# Patient Record
Sex: FEMALE | URBAN - METROPOLITAN AREA
[De-identification: names, ages, dates, MRNs, and addresses within clinical notes are randomized per-mention and may not be internally consistent; named-entity substitution may affect disease eponyms.]

---

## 2017-01-13 ENCOUNTER — THERAPY VISIT (OUTPATIENT)
Dept: PHYSICAL THERAPY | Facility: CLINIC | Age: 82
End: 2017-01-13
Payer: MEDICARE

## 2017-01-13 DIAGNOSIS — M17.0 PRIMARY OSTEOARTHRITIS OF BOTH KNEES: Primary | ICD-10-CM

## 2017-01-13 DIAGNOSIS — R26.2 DIFFICULTY WALKING: ICD-10-CM

## 2017-01-13 PROCEDURE — G8978 MOBILITY CURRENT STATUS: HCPCS | Mod: GP | Performed by: PHYSICAL THERAPIST

## 2017-01-13 PROCEDURE — 97112 NEUROMUSCULAR REEDUCATION: CPT | Mod: GP | Performed by: PHYSICAL THERAPIST

## 2017-01-13 PROCEDURE — 97110 THERAPEUTIC EXERCISES: CPT | Mod: GP | Performed by: PHYSICAL THERAPIST

## 2017-01-13 PROCEDURE — G8979 MOBILITY GOAL STATUS: HCPCS | Mod: GP | Performed by: PHYSICAL THERAPIST

## 2017-01-13 ASSESSMENT — ACTIVITIES OF DAILY LIVING (ADL)
KNEE_ACTIVITY_OF_DAILY_LIVING_SUM: 45
GO UP STAIRS: ACTIVITY IS SOMEWHAT DIFFICULT
SWELLING: I HAVE THE SYMPTOM BUT IT DOES NOT AFFECT MY ACTIVITY
STAND: ACTIVITY IS SOMEWHAT DIFFICULT
RISE FROM A CHAIR: ACTIVITY IS MINIMALLY DIFFICULT
SIT WITH YOUR KNEE BENT: ACTIVITY IS MINIMALLY DIFFICULT
HOW_WOULD_YOU_RATE_THE_OVERALL_FUNCTION_OF_YOUR_KNEE_DURING_YOUR_USUAL_DAILY_ACTIVITIES?: NEARLY NORMAL
GO DOWN STAIRS: ACTIVITY IS SOMEWHAT DIFFICULT
KNEEL ON THE FRONT OF YOUR KNEE: I AM UNABLE TO DO THE ACTIVITY
WEAKNESS: THE SYMPTOM AFFECTS MY ACTIVITY SLIGHTLY
STIFFNESS: I HAVE THE SYMPTOM BUT IT DOES NOT AFFECT MY ACTIVITY
RAW_SCORE: 45
GIVING WAY, BUCKLING OR SHIFTING OF KNEE: I HAVE THE SYMPTOM BUT IT DOES NOT AFFECT MY ACTIVITY
SQUAT: ACTIVITY IS FAIRLY DIFFICULT
LIMPING: I HAVE THE SYMPTOM BUT IT DOES NOT AFFECT MY ACTIVITY
PAIN: THE SYMPTOM AFFECTS MY ACTIVITY SLIGHTLY
KNEE_ACTIVITY_OF_DAILY_LIVING_SCORE: 64.29
WALK: ACTIVITY IS MINIMALLY DIFFICULT
AS_A_RESULT_OF_YOUR_KNEE_INJURY,_HOW_WOULD_YOU_RATE_YOUR_CURRENT_LEVEL_OF_DAILY_ACTIVITY?: NEARLY NORMAL
HOW_WOULD_YOU_RATE_THE_CURRENT_FUNCTION_OF_YOUR_KNEE_DURING_YOUR_USUAL_DAILY_ACTIVITIES_ON_A_SCALE_FROM_0_TO_100_WITH_100_BEING_YOUR_LEVEL_OF_KNEE_FUNCTION_PRIOR_TO_YOUR_INJURY_AND_0_BEING_THE_INABILITY_TO_PERFORM_ANY_OF_YOUR_USUAL_DAILY_ACTIVITIES?: 60

## 2017-01-13 NOTE — LETTER
DEPARTMENT OF HEALTH AND HUMAN SERVICES  CENTERS FOR MEDICARE & MEDICAID SERVICES    PLAN/UPDATED PLAN OF PROGRESS FOR OUTPATIENT REHABILITATION    PATIENTS NAME:  Jazzmine Obrien   : 1930  PROVIDER NUMBER:    [id number]    [id number]      PROVIDER NAME: [place] [facility]    MEDICAL RECORD NUMBER: [id number]     START OF CARE DATE:  SOC Date: 2016  TYPE:  PT    PRIMARY/TREATMENT DIAGNOSIS: (Pertinent Medical Diagnosis)   Primary osteoarthritis of both knees  Difficulty walking    VISITS FROM START OF CARE:  Rxs Used: 11    MEDICARE CERTIFICATION:  Patient insurance became Medicare on 2017. The current information from SOC on 17. This note includes information from the initial evaluation on 16 as well as SOC date.      INITIAL EVALUATION SUBJECTIVE:  Jazzmine Obrien is a 86 year old female with a bilateral knees (with difficulty walking) condition.  Condition occurred with:  Degenerative joint disease (history of 3 falls in the past 5 months).    This is a chronic condition  Patient saw MD on 16 for progressive, ongoing B knee pain and difficulty walking.    Patient reports pain:  In the joint and anterior.  Radiates to:  Lower leg.  Pain is described as stabbing and aching and is intermittent and reported as 5/10.  Associated symptoms:  Buckling/giving out, loss of strength and loss of motion/stiffness. Pain is worse in the A.M..  Symptoms are exacerbated by bending/squatting, ascending stairs, descending stairs, standing, walking and kneeling and relieved by rest and NSAID's.  Since onset symptoms are gradually worsening.  Special tests:  X-ray (DJD, not bone on bone yet).  Previous treatment includes other (knee injections, cortisone and/or gel).  There was mild improvement following previous treatment.  General health as reported by patient is good.  Pertinent medical history includes:  Diabetes, overweight, thyroid problems and  smoking.  Medical allergies: no.  Other surgeries include:  None reported.  Current medications:  Pain medication and thyroid medication.  Current occupation is Retired.    Barriers include:  Stairs and bathroom/bedroom on second floor.  Red flags:  Significant weakness.      PATIENTS NAME:  Jazzmine Obrien   : 1930    CURRENT SUBJECTIVE  HPI  Knee Activity of Daily Living Score: 64.29   Subjective: Since starting PT last August, patient feeling approximately 60% improved. For the past 4-6 weeks patient has been experiencing increased left knee pain, feeling that her most recent(11/10/16) cortisone injection helped her very much. Is able to ambulate around the house without her cane, but does use her cane when she leaves the house. Is able to walk short distances throughout a grocery store, limiting how much she walks, letting her  go and get items at the back of the store. Able to stand for 10-15 minutes with 5/10 PL(left knee).   Current Pain level: 5/10.     Initial Pain level: 5/10.   Changes in function:  None in the past 3 weeks(see goal flow sheet for current/updated functional goals)  Adverse reaction to treatment or activity: None    INITIAL OBJECTIVE FINDINGS:  Standing Alignment:    Knee:  Genu varus L and genu valgus R    Gait:    Gait Type:  Antalgic   Assistive Devices:  Cane  Deviations:  Lumbar:  Trunk flexionGeneral Deviations:  Base of support incr, peña decr, stance time decr and stride length decr    Flexibility/Screens:   Lower Extremity:  Decreased left lower extremity flexibility:Hip Flexors; Hamstrings and Gastroc  Decreased right lower extremity flexibility:  Hip Flexors; Hamstrings and Gastroc    Hip Evaluation  Hip Strength:    Flexion:   Left: 4-/5   -  Pain:  Right: 4-/5   -  Pain:                 Extension:  Left: 3-/5  -  Pain:Right: 3-/5    -  Pain:    Abduction:  Left: 3-/5    +/-   Pain:Right: 3-/5   +/-   Pain:    Knee Evaluation:  ROM:    AROM  Hyperextension:  Left:   0    Right: 0  Extension:  Left: 10    Right:  10  Flexion: Left: 112    Right: 112  Strength:   Extension:  Left: 3/5    Pain:+      Right: 3/5    Pain:+  Flexion:  Left: 3/5    Pain:+/-      Right: 3/5    Pain:+/-    Quad Set Left:  Fair    Pain: -   Quad Set Right:  Fair    Pain: +/-  Functional Testing:    PATIENTS NAME:  Jazzmine Obrien   : 1930    Proprioception:   Stork Balance Test:  Left:  Unable  Right:  Unable  % of Uninvolved:     CURRENT OBJECTIVE FINDINGS:  Objective/current function: Ambulating into the clinic with SEC, but does not use while ambualting around the clinic. AROM of knees; flexion R 120, L 116 with pain, extension left 8 and R 5. Improved SLS on right x 6-7 seconds and left x3-4 seconds. MMT of B LE's quads R 4+/5 and left 4+/4, hamstrings right 4-/5 and left 4-/5, quad setting good- bilaterally with pain generated on left.      ASSESSMENT/PLAN  Updated problem list and treatment plan: Diagnosis 1:  Bilateral knee OA/DJD, difficulty walking  Pain -  self management and home program  Decreased ROM/flexibility - therapeutic exercise, therapeutic activity and home program  Decreased strength - therapeutic exercise, therapeutic activities and home program  Decreased proprioception - neuro re-education, therapeutic activities and home program  Impaired gait - gait training and home program  Decreased function - therapeutic activities and home program  STG/LTGs have been met or progress has been made towards goals:  None in the past 3 weeks since last PN(overall progress has been made since IE 16, see goal flow sheet).  Assessment of Progress: The patient's condition is improving overall.  The patient's condition has potential to improve.  Self Management Plans:  Patient has been instructed in a home treatment program.  Patient  has been instructed in self management of symptoms.  I have re-evaluated this patient and find that the nature, scope, duration and intensity of the therapy  "is appropriate for the medical condition of the patient.  Jazzmine continues to require the following intervention to meet STG and LTG's:  PT    Recommendations:  This patient would benefit from continued therapy.     Frequency:  1 X week, once daily, every other week  Duration:  12 weeks(6 visits total)    Caregiver Signature/Credentials _____________________________ Date ________       Treating Provider: Sarah Elkins, PT     I have reviewed and certified the need for these services and plan of treatment while under my care.      PHYSICIAN'S SIGNATURE:   _________________________________________  Date___________   Anabela Paredes    Certification period:  Beginning of Cert date period: 17 to  End of Cert period date: 17     Functional Level Progress Report: Please see attached \"Goal Flow sheet for Functional level.\"  PATIENTS NAME:  Jazzmine Obrien   : 1930      ____X____ Continue Services or       ________ DC Services                Service dates: From  SOC Date: 2017 to present                           "

## 2017-01-13 NOTE — PROGRESS NOTES
Subjective:    HPI                    Objective:    System    Physical Exam    General     ROS    Assessment/Plan:      {REHAB NOTES:[id number]}

## 2017-01-16 NOTE — PROGRESS NOTES
Subjective:    HPI       Knee Activity of Daily Living Score: 64.29            Objective:    System    Physical Exam    General     ROS    Assessment/Plan:      MEDICARE CERTIFICATION:  Patient insurance became Medicare on 1/1/2017. The current information from SOC on 1/13/17. This note includes information from the initial evaluation on 8/12/16 as well as SOC date.        INITIAL EVALUATION SUBJECTIVE:    Jazzmine Obrien is a 86 year old female with a bilateral knees (with difficulty walking) condition.  Condition occurred with:  Degenerative joint disease (history of 3 falls in the past 5 months).    This is a chronic condition  Patient saw MD on 8/4/16 for progressive, ongoing B knee pain and difficulty walking.    Patient reports pain:  In the joint and anterior.  Radiates to:  Lower leg.  Pain is described as stabbing and aching and is intermittent and reported as 5/10.  Associated symptoms:  Buckling/giving out, loss of strength and loss of motion/stiffness. Pain is worse in the A.M..  Symptoms are exacerbated by bending/squatting, ascending stairs, descending stairs, standing, walking and kneeling and relieved by rest and NSAID's.  Since onset symptoms are gradually worsening.  Special tests:  X-ray (DJD, not bone on bone yet).  Previous treatment includes other (knee injections, cortisone and/or gel).  There was mild improvement following previous treatment.  General health as reported by patient is good.  Pertinent medical history includes:  Diabetes, overweight, thyroid problems and smoking.  Medical allergies: no.  Other surgeries include:  None reported.  Current medications:  Pain medication and thyroid medication.  Current occupation is Retired.        Barriers include:  Stairs and bathroom/bedroom on second floor.    Red flags:  Significant weakness.    CURRENT SUBJECTIVE  Subjective: Since starting PT last August, patient feeling approximately 60% improved. For the past 4-6 weeks patient has been  experiencing increased left knee pain, feeling that her most recent(11/10/16) cortisone injection helped her very much. Is able to ambulate around the house without her cane, but does use her cane when she leaves the house. Is able to walk short distances throughout a grocery store, limiting how much she walks, letting her  go and get items at the back of the store. Able to stand for 10-15 minutes with 5/10 PL(left knee).   Current Pain level: 5/10.     Initial Pain level: 5/10.   Changes in function:  None in the past 3 weeks(see goal flow sheet for current/updated functional goals)  Adverse reaction to treatment or activity: None    INITIAL OBJECTIVE FINDINGS:    Standing Alignment:    Knee:  Genu varus L and genu valgus R    Gait:    Gait Type:  Antalgic   Assistive Devices:  Cane  Deviations:  Lumbar:  Trunk flexionGeneral Deviations:  Base of support incr, peña decr, stance time decr and stride length decr    Flexibility/Screens:   Lower Extremity:  Decreased left lower extremity flexibility:Hip Flexors; Hamstrings and Gastroc    Decreased right lower extremity flexibility:  Hip Flexors; Hamstrings and Gastroc    Hip Evaluation    Hip Strength:    Flexion:   Left: 4-/5   -  Pain:  Right: 4-/5   -  Pain:                    Extension:  Left: 3-/5  -  Pain:Right: 3-/5    -  Pain:    Abduction:  Left: 3-/5    +/-   Pain:Right: 3-/5   +/-   Pain:    Knee Evaluation:  ROM:    AROM    Hyperextension:  Left:  0    Right: 0  Extension:  Left: 10    Right:  10  Flexion: Left: 112    Right: 112    Strength:     Extension:  Left: 3/5    Pain:+      Right: 3/5    Pain:+  Flexion:  Left: 3/5    Pain:+/-      Right: 3/5    Pain:+/-    Quad Set Left:  Fair    Pain: -   Quad Set Right:  Fair    Pain: +/-    Functional Testing:    Proprioception:   Stork Balance Test:  Left:  Unable  Right:  Unable  % of Uninvolved:       CURRENT OBJECTIVE FINDINGS:    Objective/current function: Ambulating into the clinic with SEC,  but does not use while ambualting around the clinic. AROM of knees; flexion R 120, L 116 with pain, extension left 8 and R 5. Improved SLS on right x 6-7 seconds and left x3-4 seconds. MMT of B LE's quads R 4+/5 and left 4+/4, hamstrings right 4-/5 and left 4-/5, quad setting good- bilaterally with pain generated on left.      ASSESSMENT/PLAN  Updated problem list and treatment plan: Diagnosis 1:  Bilateral knee OA/DJD, difficulty walking  Pain -  self management and home program  Decreased ROM/flexibility - therapeutic exercise, therapeutic activity and home program  Decreased strength - therapeutic exercise, therapeutic activities and home program  Decreased proprioception - neuro re-education, therapeutic activities and home program  Impaired gait - gait training and home program  Decreased function - therapeutic activities and home program  STG/LTGs have been met or progress has been made towards goals:  None in the past 3 weeks since last PN(overall progress has been made since IE 8/12/16, see goal flow sheet).  Assessment of Progress: The patient's condition is improving overall.  The patient's condition has potential to improve.  Self Management Plans:  Patient has been instructed in a home treatment program.  Patient  has been instructed in self management of symptoms.  I have re-evaluated this patient and find that the nature, scope, duration and intensity of the therapy is appropriate for the medical condition of the patient.  Jazzmine continues to require the following intervention to meet STG and LTG's:  PT    Recommendations:  This patient would benefit from continued therapy.     Frequency:  1 X week, once daily, every other week  Duration:  12 weeks(6 visits total)        Please refer to the daily flowsheet for treatment today, total treatment time and time spent performing 1:1 timed codes.

## 2017-02-03 ENCOUNTER — THERAPY VISIT (OUTPATIENT)
Dept: PHYSICAL THERAPY | Facility: CLINIC | Age: 82
End: 2017-02-03
Payer: MEDICARE

## 2017-02-03 DIAGNOSIS — R26.2 DIFFICULTY WALKING: ICD-10-CM

## 2017-02-03 DIAGNOSIS — M17.0 PRIMARY OSTEOARTHRITIS OF BOTH KNEES: Primary | ICD-10-CM

## 2017-02-03 PROCEDURE — 97110 THERAPEUTIC EXERCISES: CPT | Mod: GP | Performed by: PHYSICAL THERAPIST

## 2017-02-03 PROCEDURE — 97112 NEUROMUSCULAR REEDUCATION: CPT | Mod: GP | Performed by: PHYSICAL THERAPIST

## 2017-02-17 ENCOUNTER — THERAPY VISIT (OUTPATIENT)
Dept: PHYSICAL THERAPY | Facility: CLINIC | Age: 82
End: 2017-02-17
Payer: MEDICARE

## 2017-02-17 DIAGNOSIS — R26.2 DIFFICULTY WALKING: ICD-10-CM

## 2017-02-17 DIAGNOSIS — M17.0 PRIMARY OSTEOARTHRITIS OF BOTH KNEES: ICD-10-CM

## 2017-02-17 PROCEDURE — 97112 NEUROMUSCULAR REEDUCATION: CPT | Mod: GP | Performed by: PHYSICAL THERAPIST

## 2017-02-17 PROCEDURE — 97110 THERAPEUTIC EXERCISES: CPT | Mod: GP | Performed by: PHYSICAL THERAPIST

## 2017-03-24 ENCOUNTER — THERAPY VISIT (OUTPATIENT)
Dept: PHYSICAL THERAPY | Facility: CLINIC | Age: 82
End: 2017-03-24
Payer: MEDICARE

## 2017-03-24 DIAGNOSIS — M17.0 PRIMARY OSTEOARTHRITIS OF BOTH KNEES: ICD-10-CM

## 2017-03-24 DIAGNOSIS — R26.2 DIFFICULTY WALKING: ICD-10-CM

## 2017-03-24 PROCEDURE — G8978 MOBILITY CURRENT STATUS: HCPCS | Mod: GP | Performed by: PHYSICAL THERAPIST

## 2017-03-24 PROCEDURE — G8979 MOBILITY GOAL STATUS: HCPCS | Mod: GP | Performed by: PHYSICAL THERAPIST

## 2017-03-24 PROCEDURE — 97112 NEUROMUSCULAR REEDUCATION: CPT | Mod: GP | Performed by: PHYSICAL THERAPIST

## 2017-03-24 PROCEDURE — 97110 THERAPEUTIC EXERCISES: CPT | Mod: GP | Performed by: PHYSICAL THERAPIST

## 2017-03-24 ASSESSMENT — ACTIVITIES OF DAILY LIVING (ADL)
HOW_WOULD_YOU_RATE_THE_OVERALL_FUNCTION_OF_YOUR_KNEE_DURING_YOUR_USUAL_DAILY_ACTIVITIES?: NEARLY NORMAL
STAND: ACTIVITY IS MINIMALLY DIFFICULT
KNEE_ACTIVITY_OF_DAILY_LIVING_SUM: 51
GIVING WAY, BUCKLING OR SHIFTING OF KNEE: I HAVE THE SYMPTOM BUT IT DOES NOT AFFECT MY ACTIVITY
SIT WITH YOUR KNEE BENT: ACTIVITY IS NOT DIFFICULT
KNEE_ACTIVITY_OF_DAILY_LIVING_SCORE: 72.86
AS_A_RESULT_OF_YOUR_KNEE_INJURY,_HOW_WOULD_YOU_RATE_YOUR_CURRENT_LEVEL_OF_DAILY_ACTIVITY?: NEARLY NORMAL
LIMPING: I HAVE THE SYMPTOM BUT IT DOES NOT AFFECT MY ACTIVITY
SQUAT: ACTIVITY IS FAIRLY DIFFICULT
HOW_WOULD_YOU_RATE_THE_CURRENT_FUNCTION_OF_YOUR_KNEE_DURING_YOUR_USUAL_DAILY_ACTIVITIES_ON_A_SCALE_FROM_0_TO_100_WITH_100_BEING_YOUR_LEVEL_OF_KNEE_FUNCTION_PRIOR_TO_YOUR_INJURY_AND_0_BEING_THE_INABILITY_TO_PERFORM_ANY_OF_YOUR_USUAL_DAILY_ACTIVITIES?: 75
SWELLING: I DO NOT HAVE THE SYMPTOM
PAIN: THE SYMPTOM AFFECTS MY ACTIVITY SLIGHTLY
RAW_SCORE: 51
GO DOWN STAIRS: ACTIVITY IS MINIMALLY DIFFICULT
WALK: ACTIVITY IS NOT DIFFICULT
STIFFNESS: I DO NOT HAVE THE SYMPTOM
RISE FROM A CHAIR: ACTIVITY IS MINIMALLY DIFFICULT
KNEEL ON THE FRONT OF YOUR KNEE: I AM UNABLE TO DO THE ACTIVITY
GO UP STAIRS: ACTIVITY IS SOMEWHAT DIFFICULT
WEAKNESS: THE SYMPTOM AFFECTS MY ACTIVITY SLIGHTLY

## 2017-03-24 NOTE — LETTER
"DEPARTMENT OF HEALTH AND HUMAN SERVICES  CENTERS FOR MEDICARE & MEDICAID SERVICES    PLAN/UPDATED PLAN OF PROGRESS FOR OUTPATIENT REHABILITATION    PATIENTS NAME:  Jazzmine Obrien   : 1930  PROVIDER NUMBER:    [id number]    [id number]      PROVIDER NAME: [place] [facility] PH[facility]ICAL [facility]ERAPY    MEDICAL RECORD NUMBER: [id number]     START OF CARE DATE:  SOC Date: 16   TYPE:  PT    PRIMARY/TREATMENT DIAGNOSIS: (Pertinent Medical Diagnosis)   Primary osteoarthritis of both knees  Difficulty walking    VISITS FROM START OF CARE:  Rxs Used: 15     PROGRESS  REPORT/RE-CERTIFICATION  Progress reporting period is from 17 to 3/24/17.       SUBJECTIVE  HPI  Knee Activity of Daily Living Score: 72.86   Subjective: Pt reports having gel injections 3/16/17 for right knee and 3/23/17 for left  and will have the last injection next Thursday. Has been a little sore from the injections, left knee > right knee. Pt reports had viral infection for 2 weeks since last in clinic and her  has also been sick with pneumonia. Continues to ambulate throughout her home without AD, and uses her cane when out in the community.  Standing ability remains up to 12-15 minutes with increased knee pain up to 6-7/10.  Current Pain level: 6/10.     Initial Pain level: 5/10.   Changes in function:  None  Adverse reaction to treatment or activity: None    OBJECTIVE  Objective: Ambulating into clinic with SEC. Improved AROM of left knee 0-3-120 and AROM of right knee 0-3-124. Improved MMT of B LE's quads B 4+/5, hamstrings B 5/5, glut med strength 4+/5 bilaterally.  SLS R 6 sec, L 4 sec. Unsupported tandem stance x20 sec R foot back, x10 sec L foot back. 5x Sit to Stand test: 17 seconds. Able to perform 4\" steps ups in clinic. Improved ADL score from 64% to 72%.     ASSESSMENT/PLAN  Updated problem list and treatment plan: Diagnosis 1:  Bilateral knee OA/DJD, difficulty walking  Pain -  home " "program  Decreased ROM/flexibility - therapeutic exercise, therapeutic activity and home program  PATIENTS NAME:  Jazzmine Obrien   : 1930    Decreased strength - therapeutic exercise, therapeutic activities and home program  Decreased proprioception - neuro re-education, therapeutic activities and home program  Decreased function - therapeutic activities and home program  STG/LTGs have been met or progress has been made towards goals:  No signifcant change in function since 17 due to lack of attendance due to recent illness and husbands illness(as he drives her to appts)  Assessment of Progress: The patient's condition is improving.  The patient's condition has potential to improve.  Self Management Plans:  Patient has been instructed in a home treatment program.  Patient  has been instructed in self management of symptoms.  I have re-evaluated this patient and find that the nature, scope, duration and intensity of the therapy is appropriate for the medical condition of the patient.  Jazzmine continues to require the following intervention to meet STG and LTG's:  PT    Recommendations:  This patient would benefit from continued therapy.     Frequency:  1 X week, every 2 weeks once daily  Duration:  for 6 weeks( 3 visits remain on MD orders)    Caregiver Signature/Credentials _____________________________ Date ________       Treating Provider: Sarah Elkins, PT     I have reviewed and certified the need for these services and plan of treatment while under my care.        PHYSICIAN'S SIGNATURE:   _________________________________________  Date___________   Anabela Paredes    Certification period:  Beginning of Cert date period: 17 to  End of Cert period date: 17     Functional Level Progress Report: Please see attached \"Goal Flow sheet for Functional level.\"    ____X____ Continue Services or       ________ DC Services                Service dates: From  SOC Date: 16 date to " present

## 2017-03-24 NOTE — MR AVS SNAPSHOT
"              After Visit Summary   3/24/2017    Jazzmine Obrien    MRN: [id number]           Patient Information     Date Of Birth          4/1/1930        Visit Information        Provider Department      3/24/2017 2:10 PM Sarah Mota PT [facility]        Today's Diagnoses     Primary osteoarthritis of both knees        Difficulty walking           Follow-ups after your visit        Your next 10 appointments already scheduled     Apr 07, 2017  2:10 PM CDT   PO Extremity with Sarah Bashir PT   [facility] ([address] [place])    [address] [address]   [phone]              Who to contact     If you have questions or need follow up information about today's clinic visit or your schedule please contact [facility] PH[facility]ERAP[facility] directly at [phone].  Normal or non-critical lab and imaging results will be communicated to you by [employer], letter or phone within 4 business days after the clinic has received the results. If you do not hear from us within 7 days, please contact the clinic through [employer] or phone. If you have a critical or abnormal lab result, we will notify you by phone as soon as possible.  Submit refill requests through [employer] or call your pharmacy and they will forward the refill request to us. Please allow 3 business days for your refill to be completed.          Additional Information About Your Visit        [employer] Information     [employer] lets you send messages to your doctor, view your test results, renew your prescriptions, schedule appointments and more. To sign up, go to [email or web address] . Click on \"Log in\" on the left side of the screen, which will take you to the Welcome page. Then click on \"Sign up Now\" on the right side of the page.     You will be asked to " enter the access code listed below, as well as some personal information. Please follow the directions to create your username and password.     Your access code is: [email or web address]  Expires: 2017  6:01 PM     Your access code will  in 90 days. If you need help or a new code, please call your [facility] clinic or [phone].        Care EveryWhere ID     This is your Care EveryWhere ID. This could be used by other organizations to access your [facility] medical records  [id number]         Blood Pressure from Last 3 Encounters:   No data found for BP    Weight from Last 3 Encounters:   No data found for Wt              We Performed the Following     PO PROGRESS NOTES REPORT     PO RE-CERT REPORT     NEUROMUSCULAR RE-EDUCATION     THERAPEUTIC EXERCISES        Primary Care Provider    None Specified       No primary provider on file.        Thank you!     Thank you for choosing [facility] THERAP[facility]  for your care. Our goal is always to provide you with excellent care. Hearing back from our patients is one way we can continue to improve our services. Please take a few minutes to complete the written survey that you may receive in the mail after your visit with us. Thank you!             Your Updated Medication List - Protect others around you: Learn how to safely use, store and throw away your medicines at www.disposemymeds.org.      Notice  As of 3/24/2017  3:42 PM    You have not been prescribed any medications.

## 2017-03-24 NOTE — PROGRESS NOTES
"Subjective:    HPI       Knee Activity of Daily Living Score: 72.86            Objective:    System    Physical Exam    General     ROS    Assessment/Plan:      PROGRESS  REPORT/RE-CERTIFICATION    Progress reporting period is from 1/13/17 to 3/24/17.       SUBJECTIVE  Subjective: Pt reports having gel injections 3/16/17 for right knee and 3/23/17 for left  and will have the last injection next Thursday. Has been a little sore from the injections, left knee > right knee. Pt reports had viral infection for 2 weeks since last in clinic and her  has also been sick with pneumonia. Continues to ambulate throughout her home without AD, and uses her cane when out in the community.  Standing ability remains up to 12-15 minutes with increased knee pain up to 6-7/10.  Current Pain level: 6/10.     Initial Pain level: 5/10.   Changes in function:  None  Adverse reaction to treatment or activity: None    OBJECTIVE    Objective: Ambulating into clinic with SEC. Improved AROM of left knee 0-3-120 and AROM of right knee 0-3-124. Improved MMT of B LE's quads B 4+/5, hamstrings B 5/5, glut med strength 4+/5 bilaterally.  SLS R 6 sec, L 4 sec. Unsupported tandem stance x20 sec R foot back, x10 sec L foot back. 5x Sit to Stand test: 17 seconds. Able to perform 4\" steps ups in clinic. Improved ADL score from 64% to 72%.     ASSESSMENT/PLAN  Updated problem list and treatment plan: Diagnosis 1:  Bilateral knee OA/DJD, difficulty walking  Pain -  home program  Decreased ROM/flexibility - therapeutic exercise, therapeutic activity and home program  Decreased strength - therapeutic exercise, therapeutic activities and home program  Decreased proprioception - neuro re-education, therapeutic activities and home program  Decreased function - therapeutic activities and home program  STG/LTGs have been met or progress has been made towards goals:  No signifcant change in function since 1/13/17 due to lack of attendance due to recent " illness and husbands illness(as he drives her to appts)  Assessment of Progress: The patient's condition is improving.  The patient's condition has potential to improve.  Self Management Plans:  Patient has been instructed in a home treatment program.  Patient  has been instructed in self management of symptoms.  I have re-evaluated this patient and find that the nature, scope, duration and intensity of the therapy is appropriate for the medical condition of the patient.  Jazzmine continues to require the following intervention to meet STG and LTG's:  PT    Recommendations:  This patient would benefit from continued therapy.     Frequency:  1 X week, every 2 weeks once daily  Duration:  for 6 weeks( 3 visits remain on MD orders)        Please refer to the daily flowsheet for treatment today, total treatment time and time spent performing 1:1 timed codes.

## 2017-04-07 ENCOUNTER — THERAPY VISIT (OUTPATIENT)
Dept: PHYSICAL THERAPY | Facility: CLINIC | Age: 82
End: 2017-04-07
Payer: MEDICARE

## 2017-04-07 DIAGNOSIS — M17.0 PRIMARY OSTEOARTHRITIS OF BOTH KNEES: ICD-10-CM

## 2017-04-07 DIAGNOSIS — R26.2 DIFFICULTY WALKING: ICD-10-CM

## 2017-04-07 PROCEDURE — 97110 THERAPEUTIC EXERCISES: CPT | Mod: GP | Performed by: PHYSICAL THERAPIST

## 2017-04-07 PROCEDURE — 97112 NEUROMUSCULAR REEDUCATION: CPT | Mod: GP | Performed by: PHYSICAL THERAPIST

## 2017-05-26 ENCOUNTER — THERAPY VISIT (OUTPATIENT)
Dept: PHYSICAL THERAPY | Facility: CLINIC | Age: 82
End: 2017-05-26
Payer: MEDICARE

## 2017-05-26 DIAGNOSIS — R26.2 DIFFICULTY WALKING: ICD-10-CM

## 2017-05-26 DIAGNOSIS — M17.0 PRIMARY OSTEOARTHRITIS OF BOTH KNEES: ICD-10-CM

## 2017-05-26 PROCEDURE — 97112 NEUROMUSCULAR REEDUCATION: CPT | Mod: GP | Performed by: PHYSICAL THERAPIST

## 2017-05-26 PROCEDURE — 97110 THERAPEUTIC EXERCISES: CPT | Mod: GP | Performed by: PHYSICAL THERAPIST

## 2017-05-26 PROCEDURE — G8979 MOBILITY GOAL STATUS: HCPCS | Mod: GP | Performed by: PHYSICAL THERAPIST

## 2017-05-26 PROCEDURE — 97164 PT RE-EVAL EST PLAN CARE: CPT | Mod: GP | Performed by: PHYSICAL THERAPIST

## 2017-05-26 PROCEDURE — G8978 MOBILITY CURRENT STATUS: HCPCS | Mod: GP | Performed by: PHYSICAL THERAPIST

## 2017-05-26 ASSESSMENT — ACTIVITIES OF DAILY LIVING (ADL)
KNEE_ACTIVITY_OF_DAILY_LIVING_SCORE: 60
KNEE_ACTIVITY_OF_DAILY_LIVING_SUM: 42
PAIN: THE SYMPTOM AFFECTS MY ACTIVITY MODERATELY
STAND: ACTIVITY IS SOMEWHAT DIFFICULT
RAW_SCORE: 42
LIMPING: I HAVE THE SYMPTOM BUT IT DOES NOT AFFECT MY ACTIVITY
WEAKNESS: THE SYMPTOM AFFECTS MY ACTIVITY MODERATELY
HOW_WOULD_YOU_RATE_THE_CURRENT_FUNCTION_OF_YOUR_KNEE_DURING_YOUR_USUAL_DAILY_ACTIVITIES_ON_A_SCALE_FROM_0_TO_100_WITH_100_BEING_YOUR_LEVEL_OF_KNEE_FUNCTION_PRIOR_TO_YOUR_INJURY_AND_0_BEING_THE_INABILITY_TO_PERFORM_ANY_OF_YOUR_USUAL_DAILY_ACTIVITIES?: 65
WALK: ACTIVITY IS SOMEWHAT DIFFICULT
KNEEL ON THE FRONT OF YOUR KNEE: I AM UNABLE TO DO THE ACTIVITY
GIVING WAY, BUCKLING OR SHIFTING OF KNEE: THE SYMPTOM AFFECTS MY ACTIVITY MODERATELY
GO DOWN STAIRS: ACTIVITY IS MINIMALLY DIFFICULT
RISE FROM A CHAIR: ACTIVITY IS SOMEWHAT DIFFICULT
GO UP STAIRS: ACTIVITY IS SOMEWHAT DIFFICULT
SIT WITH YOUR KNEE BENT: ACTIVITY IS NOT DIFFICULT
STIFFNESS: I HAVE THE SYMPTOM BUT IT DOES NOT AFFECT MY ACTIVITY
HOW_WOULD_YOU_RATE_THE_OVERALL_FUNCTION_OF_YOUR_KNEE_DURING_YOUR_USUAL_DAILY_ACTIVITIES?: ABNORMAL
SQUAT: ACTIVITY IS FAIRLY DIFFICULT
SWELLING: I DO NOT HAVE THE SYMPTOM
AS_A_RESULT_OF_YOUR_KNEE_INJURY,_HOW_WOULD_YOU_RATE_YOUR_CURRENT_LEVEL_OF_DAILY_ACTIVITY?: ABNORMAL

## 2017-05-26 NOTE — LETTER
DEPARTMENT OF HEALTH AND HUMAN SERVICES  CENTERS FOR MEDICARE & MEDICAID SERVICES    PLAN/UPDATED PLAN OF PROGRESS FOR OUTPATIENT REHABILITATION    PATIENTS NAME:  Jazzmine Obrien   : 1930  PROVIDER NUMBER:    [id number]    [id number]    PROVIDER NAME: [facility] PH[facility]ICAL [facility]ERAP[facility]    MEDICAL RECORD NUMBER: [id number]     START OF CARE DATE:  SOC Date: 16   TYPE:  PT    PRIMARY/TREATMENT DIAGNOSIS: (Pertinent Medical Diagnosis)   Primary osteoarthritis of both knees  Difficulty walking    VISITS FROM START OF CARE:  Rxs Used: 17     PROGRESS  REPORT/RE-CERTIFICATION/RE-EVALUATION  Progress reporting period is from 3/24/17 to 17.       SUBJECTIVE  HPI  Knee Activity of Daily Living Score: 60   Subjective: Patient returns to clinic after a 6+ week absence.  She reports she fell at home about one week after her last visit to the clinic when her R knee gave out, hitting her head. She is not sure which day it was exactly, but she thinks it was on 17. Saw MD yesterday and will be having a CAT scan for her head. Overall, feeling weaker, especially right LE, lacking ability to stand and/or walk as long as she was able to. Has resumed having difficulty getting out of chair.  Current pain c/o include increased left knee pain, no pain in right knee rather fatigue.     Current Pain level: 6/10.     Initial Pain level: 5/10.   Changes in function:  Regression of progress since recent fall, STG/LTG #1 and #2 no longer met.  Adverse reaction to treatment or activity: increased pain and weakness since recent fall.    OBJECTIVE  Changes noted in objective findings:  Regression of gait, AROM, strength, and balance since recent fall.  Objective: Ambulating into clinic slowly with small steps, reduced TIMMY and valgus deformity on right. Reduced AROM of knees: Right 0-8-118(from 0-3-124), Left 0-(from 0-3-120). Reduced MMT: B hip flexors 3+/5, B knee extension 4-/5(with  crepitus), B knee flexion 3+/5. Unable to SLS on either LE. Reduced ADL score from 72.68% to 60%.    PATIENTS NAME:  Jazzmine Obrien   : 1930    ASSESSMENT/UP DATED POC  Updated problem list and treatment plan: Diagnosis 1:  B knee DJD, difficulty walking  Pain -  self management, education and home program  Decreased ROM/flexibility - therapeutic exercise, therapeutic activity and home program  Decreased strength - therapeutic exercise, therapeutic activities and home program  Decreased proprioception - neuro re-education, gait training, therapeutic activities and home program  Impaired gait - gait training, assistive devices and home program  Decreased function - therapeutic activities and home program  STG/LTGs have been met or progress has been made towards goals:  Regression of progress, STG/LTG reset with new goal dates.  Assessment of Progress: The patient has had set backs in their progress due to recent fall.  Self Management Plans:  Patient has been instructed in a home treatment program.  Patient  has been instructed in self management of symptoms.  I have re-evaluated this patient and find that the nature, scope, duration and intensity of the therapy is appropriate for the medical condition of the patient.  Jazzmine continues to require the following intervention to meet STG and LTG's:  PT    Recommendations:  This patient would benefit from continued therapy.     Frequency:  1 X week, once daily  Duration: for 8 weeks    Caregiver Signature/Credentials _____________________________ Date ________       Treating Provider: Sarah Elkins, PT     I have reviewed and certified the need for these services and plan of treatment while under my care.        PHYSICIAN'S SIGNATURE:   _________________________________________  Date___________   Anabela Paredes    Certification period:  Beginning of Cert date period: 17 to  End of Cert period date: 17     Functional Level Progress Report:  "Please see attached \"Goal Flow sheet for Functional level.\"    ____X____ Continue Services or       ________ DC Services                Service dates: From  SOC Date: 08/12/16 date to present                         "

## 2017-05-26 NOTE — MR AVS SNAPSHOT
"              After Visit Summary   5/26/2017    Jazzmine Obrien    MRN: [id number]           Patient Information     Date Of Birth          4/1/1930        Visit Information        Provider Department      5/26/2017 2:10 PM Sarah Mota PT [facility] Physical Therapy        Today's Diagnoses     Primary osteoarthritis of both knees        Difficulty walking           Follow-ups after your visit        Your next 10 appointments already scheduled     Jun 08, 2017  2:50 PM CDT   PO Extremity with Sarah Bashir PT   [facility] Physical Therapy ([facility] [place])    [address] [address]   [phone]              Who to contact     If you have questions or need follow up information about today's clinic visit or your schedule please contact [facility] PHYSICAL THERAPY directly at [phone].  Normal or non-critical lab and imaging results will be communicated to you by [employer], letter or phone within 4 business days after the clinic has received the results. If you do not hear from us within 7 days, please contact the clinic through [employer] or phone. If you have a critical or abnormal lab result, we will notify you by phone as soon as possible.  Submit refill requests through [employer] or call your pharmacy and they will forward the refill request to us. Please allow 3 business days for your refill to be completed.          Additional Information About Your Visit        [employer] Information     [employer] lets you send messages to your doctor, view your test results, renew your prescriptions, schedule appointments and more. To sign up, go to [email or web address] . Click on \"Log in\" on the left side of the screen, which will take you to the Welcome page. Then click on \"Sign up Now\" on the right side of the page.     You will be asked to " enter the access code listed below, as well as some personal information. Please follow the directions to create your username and password.     Your access code is: [email or web address]  Expires: 2017  1:02 AM     Your access code will  in 90 days. If you need help or a new code, please call your [facility] clinic or [phone].        Care EveryWhere ID     This is your Care EveryWhere ID. This could be used by other organizations to access your [facility] medical records  [id number]         Blood Pressure from Last 3 Encounters:   No data found for BP    Weight from Last 3 Encounters:   No data found for Wt              We Performed the Following     PO PROGRESS NOTES REPORT     NEUROMUSCULAR RE-EDUCATION     PT Re-Eval ([id number])     THERAPEUTIC EXERCISES        Primary Care Provider    None Specified       No primary provider on file.        Thank you!     Thank you for choosing [facility] PH[facility]ICAL THERAPY  for your care. Our goal is always to provide you with excellent care. Hearing back from our patients is one way we can continue to improve our services. Please take a few minutes to complete the written survey that you may receive in the mail after your visit with us. Thank you!             Your Updated Medication List - Protect others around you: Learn how to safely use, store and throw away your medicines at www.disposemymeds.org.      Notice  As of 2017 11:59 PM    You have not been prescribed any medications.

## 2017-05-26 NOTE — LETTER
[facility] PHYSICAL THERAPY  [address]  [place] [address]  [phone]    May 30, 2017    Re: Jazzmine Obrien   :   1930  MRN:  [id number]   REFERRING PHYSICIAN:   Anabela Paredes    [facility] PHYSICAL THERAPY    Date of Initial Evaluation:  2016  Visits:  Rxs Used: 17  Reason for Referral:     Primary osteoarthritis of both knees  Difficulty walking    PROGRESS  REPORT/RE-CERTIFICATION/RE-EVALUATION  Progress reporting period is from 3/24/17 to 17.       SUBJECTIVE  HPI  Knee Activity of Daily Living Score: 60  Subjective: Patient returns to clinic after a 6+ week absence.  She reports she fell at home about one week after her last visit to the clinic when her R knee gave out, hitting her head. She is not sure which day it was exactly, but she thinks it was on 17. Saw MD yesterday and will be having a CAT scan for her head. Overall, feeling weaker, especially right LE, lacking ability to stand and/or walk as long as she was able to. Has resumed having difficulty getting out of chair.  Current pain c/o include increased left knee pain, no pain in right knee rather fatigue.     Current Pain level: 6/10.     Initial Pain level: 5/10.   Changes in function:  Regression of progress since recent fall, STG/LTG #1 and #2 no longer met.  Adverse reaction to treatment or activity: increased pain and weakness since recent fall.    OBJECTIVE  Changes noted in objective findings:  Regression of gait, AROM, strength, and balance since recent fall.  Objective: Ambulating into clinic slowly with small steps, reduced TIMMY and valgus deformity on right. Reduced AROM of knees: Right 0-8-118(from 0-3-124), Left 0-(from 0-3-120). Reduced MMT: B hip flexors 3+/5, B knee extension 4-/5(with crepitus), B knee flexion 3+/5. Unable to SLS on either LE. Reduced ADL score from 72.68% to 60%.    ASSESSMENT/UP  DATED POC  Updated problem list and treatment plan: Diagnosis 1:  B knee DJD, difficulty walking  Pain -  self management, education and home program  Re: Jazzmine Obrien   :   1930    Decreased ROM/flexibility - therapeutic exercise, therapeutic activity and home program  Decreased strength - therapeutic exercise, therapeutic activities and home program  Decreased proprioception - neuro re-education, gait training, therapeutic activities and home program  Impaired gait - gait training, assistive devices and home program  Decreased function - therapeutic activities and home program  STG/LTGs have been met or progress has been made towards goals:  Regression of progress, STG/LTG reset with new goal dates.  Assessment of Progress: The patient has had set backs in their progress due to recent fall.  Self Management Plans:  Patient has been instructed in a home treatment program.  Patient  has been instructed in self management of symptoms.  I have re-evaluated this patient and find that the nature, scope, duration and intensity of the therapy is appropriate for the medical condition of the patient.  Jazzmine continues to require the following intervention to meet STG and LTG's:  PT    Recommendations:  This patient would benefit from continued therapy.     Frequency:  1 X week, once daily  Duration: for 8 weeks    Thank you for your referral.    INQUIRIES  Therapist: Sarah Elkins, PT   [facility] [place] [facility]  [address] [address]  Phone: [phone]  Fax: [phone]

## 2017-05-29 NOTE — PROGRESS NOTES
Subjective:    HPI       Knee Activity of Daily Living Score: 60            Objective:    System    Physical Exam    General     ROS    Assessment/Plan:      PROGRESS  REPORT/RE-CERTIFICATION/RE-EVALUATION    Progress reporting period is from 3/24/17 to 5/26/17.       SUBJECTIVE  Subjective: Patient returns to clinic after a 6+ week absence.  She reports she fell at home about one week after her last visit to the clinic when her R knee gave out, hitting her head. She is not sure which day it was exactly, but she thinks it was on 4/12/17. Saw MD yesterday and will be having a CAT scan for her head. Overall, feeling weaker, especially right LE, lacking ability to stand and/or walk as long as she was able to. Has resumed having difficulty getting out of chair.  Current pain c/o include increased left knee pain, no pain in right knee rather fatigue.     Current Pain level: 6/10.     Initial Pain level: 5/10.   Changes in function:  Regression of progress since recent fall, STG/LTG #1 and #2 no longer met.  Adverse reaction to treatment or activity: increased pain and weakness since recent fall.    OBJECTIVE  Changes noted in objective findings:  Regression of gait, AROM, strength, and balance since recent fall.  Objective: Ambulating into clinic slowly with small steps, reduced TIMMY and valgus deformity on right. Reduced AROM of knees: Right 0-8-118(from 0-3-124), Left 0-(from 0-3-120). Reduced MMT: B hip flexors 3+/5, B knee extension 4-/5(with crepitus), B knee flexion 3+/5. Unable to SLS on either LE. Reduced ADL score from 72.68% to 60%.    ASSESSMENT/UP DATED POC  Updated problem list and treatment plan: Diagnosis 1:  B knee DJD, difficulty walking  Pain -  self management, education and home program  Decreased ROM/flexibility - therapeutic exercise, therapeutic activity and home program  Decreased strength - therapeutic exercise, therapeutic activities and home program  Decreased proprioception - neuro  re-education, gait training, therapeutic activities and home program  Impaired gait - gait training, assistive devices and home program  Decreased function - therapeutic activities and home program  STG/LTGs have been met or progress has been made towards goals:  Regression of progress, STG/LTG reset with new goal dates.  Assessment of Progress: The patient has had set backs in their progress due to recent fall.  Self Management Plans:  Patient has been instructed in a home treatment program.  Patient  has been instructed in self management of symptoms.  I have re-evaluated this patient and find that the nature, scope, duration and intensity of the therapy is appropriate for the medical condition of the patient.  Jazzmine continues to require the following intervention to meet STG and LTG's:  PT    Recommendations:  This patient would benefit from continued therapy.     Frequency:  1 X week, once daily  Duration: for 8 weeks        Please refer to the daily flowsheet for treatment today, total treatment time and time spent performing 1:1 timed codes.

## 2017-06-08 ENCOUNTER — THERAPY VISIT (OUTPATIENT)
Dept: PHYSICAL THERAPY | Facility: CLINIC | Age: 82
End: 2017-06-08
Payer: MEDICARE

## 2017-06-08 DIAGNOSIS — R26.2 DIFFICULTY WALKING: ICD-10-CM

## 2017-06-08 DIAGNOSIS — M17.0 PRIMARY OSTEOARTHRITIS OF BOTH KNEES: ICD-10-CM

## 2017-06-08 PROCEDURE — 97110 THERAPEUTIC EXERCISES: CPT | Mod: GP | Performed by: PHYSICAL THERAPIST

## 2017-06-08 PROCEDURE — 97112 NEUROMUSCULAR REEDUCATION: CPT | Mod: GP | Performed by: PHYSICAL THERAPIST

## 2017-07-07 ENCOUNTER — THERAPY VISIT (OUTPATIENT)
Dept: PHYSICAL THERAPY | Facility: CLINIC | Age: 82
End: 2017-07-07
Payer: MEDICARE

## 2017-07-07 DIAGNOSIS — M17.0 PRIMARY OSTEOARTHRITIS OF BOTH KNEES: ICD-10-CM

## 2017-07-07 DIAGNOSIS — R26.2 DIFFICULTY WALKING: ICD-10-CM

## 2017-07-07 PROCEDURE — 97110 THERAPEUTIC EXERCISES: CPT | Mod: GP | Performed by: PHYSICAL THERAPIST

## 2017-07-28 ENCOUNTER — THERAPY VISIT (OUTPATIENT)
Dept: PHYSICAL THERAPY | Facility: CLINIC | Age: 82
End: 2017-07-28
Payer: MEDICARE

## 2017-07-28 DIAGNOSIS — M17.0 PRIMARY OSTEOARTHRITIS OF BOTH KNEES: ICD-10-CM

## 2017-07-28 DIAGNOSIS — R26.2 DIFFICULTY WALKING: ICD-10-CM

## 2017-07-28 PROCEDURE — 97110 THERAPEUTIC EXERCISES: CPT | Mod: GP | Performed by: PHYSICAL THERAPIST

## 2017-07-28 PROCEDURE — 97112 NEUROMUSCULAR REEDUCATION: CPT | Mod: GP | Performed by: PHYSICAL THERAPIST

## 2017-07-28 ASSESSMENT — ACTIVITIES OF DAILY LIVING (ADL)
LIMPING: I HAVE THE SYMPTOM BUT IT DOES NOT AFFECT MY ACTIVITY
SQUAT: ACTIVITY IS FAIRLY DIFFICULT
STAND: ACTIVITY IS SOMEWHAT DIFFICULT
SWELLING: I DO NOT HAVE THE SYMPTOM
AS_A_RESULT_OF_YOUR_KNEE_INJURY,_HOW_WOULD_YOU_RATE_YOUR_CURRENT_LEVEL_OF_DAILY_ACTIVITY?: ABNORMAL
WEAKNESS: THE SYMPTOM AFFECTS MY ACTIVITY MODERATELY
GO DOWN STAIRS: ACTIVITY IS MINIMALLY DIFFICULT
KNEE_ACTIVITY_OF_DAILY_LIVING_SUM: 43
GO UP STAIRS: ACTIVITY IS SOMEWHAT DIFFICULT
SIT WITH YOUR KNEE BENT: ACTIVITY IS NOT DIFFICULT
KNEEL ON THE FRONT OF YOUR KNEE: I AM UNABLE TO DO THE ACTIVITY
WALK: ACTIVITY IS SOMEWHAT DIFFICULT
RISE FROM A CHAIR: ACTIVITY IS SOMEWHAT DIFFICULT
GIVING WAY, BUCKLING OR SHIFTING OF KNEE: THE SYMPTOM AFFECTS MY ACTIVITY SLIGHTLY
HOW_WOULD_YOU_RATE_THE_OVERALL_FUNCTION_OF_YOUR_KNEE_DURING_YOUR_USUAL_DAILY_ACTIVITIES?: ABNORMAL
RAW_SCORE: 43
PAIN: THE SYMPTOM AFFECTS MY ACTIVITY MODERATELY
HOW_WOULD_YOU_RATE_THE_CURRENT_FUNCTION_OF_YOUR_KNEE_DURING_YOUR_USUAL_DAILY_ACTIVITIES_ON_A_SCALE_FROM_0_TO_100_WITH_100_BEING_YOUR_LEVEL_OF_KNEE_FUNCTION_PRIOR_TO_YOUR_INJURY_AND_0_BEING_THE_INABILITY_TO_PERFORM_ANY_OF_YOUR_USUAL_DAILY_ACTIVITIES?: 65
STIFFNESS: I HAVE THE SYMPTOM BUT IT DOES NOT AFFECT MY ACTIVITY
KNEE_ACTIVITY_OF_DAILY_LIVING_SCORE: 61.43

## 2017-07-28 NOTE — LETTER
[facility] ATH[facility]IC [facility] PHYSICAL THERAPY  [address]  [place] [address]  [phone]    2017    Re: Jazzmine Obrien   :   1930  MRN:  [id number]   REFERRING PHYSICIAN:   Anabela Paredes    [facility] PHYSICAL THERAPY    Date of Initial Evaluation: 2016  Visits:  Rxs Used: 20  Reason for Referral:     Primary osteoarthritis of both knees  Difficulty walking    PROGRESS  REPORT  Progress reporting period is from 17 to 17.       SUBJECTIVE  HPI  Knee Activity of Daily Living Score: 61.43   Subjective: Patient had a head CT scan and there has been no brain bleeding since she fell in April. Is thinking about having the stress test her MD ordered as she notes she has become so fatigued for the past 4-6 months. Waiting for next injections in both her knees with the gel. Overall, pt continues to perform her HEP at home as much as possible.  Has tended to self limit her activity within the home and out side the home(not going out as often), she is aware this is to her detriment. Able to stand when cooking longer if she leans on th counter for support. Exeriences intermittent difficulty getting out of a chair.  Current Pain level: 4/10.     Initial Pain level: 5/10.   Changes in function:  Yes (See Goal flowsheet attached for changes in current functional level)  Adverse reaction to treatment or activity: None    OBJECTIVE  Objective: Patient noted overall, to have a flatter affect than usual in clinic. Ambulating with similar deviations through clinic with reduced peña, small shuffling steps with SEC. Patient consistently needing several tries to risefrom chair with UE assistance. AROM of R knee 0- and L knee 0-. No improvement from 17 PN in MMT: B hip flexors 3+/5, B knee extension 4-/5(with crepitus), B knee flexion 3+/5. Unable to SLS on either LE. Slight improvement in ADL  score from 60% to 61.3%.     ASSESSMENT/PLAN  Updated problem list and treatment plan: Diagnosis 1:  B knee DJD/difficulty walking  Pain -  self management, education and home program  Decreased ROM/flexibility - therapeutic exercise, therapeutic activity and home program  Decreased strength - therapeutic exercise, therapeutic activities and home program  Re: Jazzmine Obrien   :   1930    Decreased proprioception - neuro re-education, gait training, therapeutic activities and home program  Impaired gait - gait training, assistive devices and home program  Decreased function - therapeutic activities and home program  STG/LTGs have been met or progress has been made towards goals:  Yes (See Goal flow sheet completed today.)  Assessment of Progress: The patient's condition is slowly improving after fall last April.  Self Management Plans:  Patient has been instructed in a home treatment program.  Patient  has been instructed in self management of symptoms.  I have re-evaluated this patient and find that the nature, scope, duration and intensity of the therapy is appropriate for the medical condition of the patient.  Jazzmine continues to require the following intervention to meet STG and LTG's:  PT    Recommendations:  This patient would benefit from continued therapy.     Frequency:  1 X week, once daily  Duration:  Every other week(total of 6 visits remaining, 12 addition weeks of PT anticipated)    Thank you for your referral.    INQUIRIES  Therapist: Sarah Elkins, PT  [facility] [place] [facility]  [address] [address]  Phone: [phone]  Fax: [phone]

## 2017-07-28 NOTE — MR AVS SNAPSHOT
"              After Visit Summary   7/28/2017    Jazzmine Obrien    MRN: [id number]           Patient Information     Date Of Birth          4/1/1930        Visit Information        Provider Department      7/28/2017 2:10 PM Sarah Mota PT [facility] Physical Therapy        Today's Diagnoses     Primary osteoarthritis of both knees        Difficulty walking           Follow-ups after your visit        Your next 10 appointments already scheduled     Aug 11, 2017  2:10 PM CDT   PO Extremity with Sarah Bashir PT   [facility]    [address] [address]   [phone]              Who to contact     If you have questions or need follow up information about today's clinic visit or your schedule please contact [facility] ATHLETIC [facility] PHYSICAL THERAPY directly at [phone].  Normal or non-critical lab and imaging results will be communicated to you by [employer], letter or phone within 4 business days after the clinic has received the results. If you do not hear from us within 7 days, please contact the clinic through [employer] or phone. If you have a critical or abnormal lab result, we will notify you by phone as soon as possible.  Submit refill requests through [employer] or call your pharmacy and they will forward the refill request to us. Please allow 3 business days for your refill to be completed.          Additional Information About Your Visit        [employer] Information     [employer] lets you send messages to your doctor, view your test results, renew your prescriptions, schedule appointments and more. To sign up, go to [email or web address] . Click on \"Log in\" on the left side of the screen, which will take you to the Welcome page. Then click on \"Sign up Now\" on the right side of the page.     You will be asked to " enter the access code listed below, as well as some personal information. Please follow the directions to create your username and password.     Your access code is: [email or web address]  Expires: 2017  1:02 AM     Your access code will  in 90 days. If you need help or a new code, please call your [facility] clinic or [phone].        Care EveryWhere ID     This is your Care EveryWhere ID. This could be used by other organizations to access your [facility] medical records  [id number]         Blood Pressure from Last 3 Encounters:   No data found for BP    Weight from Last 3 Encounters:   No data found for Wt              We Performed the Following     PO PROGRESS NOTES REPORT     NEUROMUSCULAR RE-EDUCATION     THERAPEUTIC EXERCISES        Primary Care Provider    None Specified       No primary provider on file.        Equal Access to Services     ETHAN WADDELL : Jacqui Lowry, waaxda luqadaha, qaybta kaalmada han, kaci castle . So [facility] [phone].    ATENCIÓN: Si habla español, tiene a goldstein disposición servicios gratuitos de asistencia lingüística. Llame al [phone].    We comply with applicable federal civil rights laws and [place] laws. We do not discriminate on the basis of race, color, national origin, age, disability sex, sexual orientation or gender identity.            Thank you!     Thank you for choosing [facility] THERAP[facility]  for your care. Our goal is always to provide you with excellent care. Hearing back from our patients is one way we can continue to improve our services. Please take a few minutes to complete the written survey that you may receive in the mail after your visit with us. Thank you!             Your Updated Medication List - Protect others around you: Learn how to safely use, store and throw away your medicines at www.disposemymeds.org.      Notice  As of 2017  4:35 PM    You have  not been prescribed any medications.

## 2017-07-28 NOTE — PROGRESS NOTES
Subjective:    HPI       Knee Activity of Daily Living Score: 61.43            Objective:    System    Physical Exam    General     ROS    Assessment/Plan:      PROGRESS  REPORT    Progress reporting period is from 5/26/17 to 7/28/17.       SUBJECTIVE  Subjective: Patient had a head CT scan and there has been no brain bleeding since she fell in April. Is thinking about having the stress test her MD ordered as she notes she has become so fatigued for the past 4-6 months. Waiting for next injections in both her knees with the gel. Overall, pt continues to perform her HEP at home as much as possible.  Has tended to self limit her activity within the home and out side the home(not going out as often), she is aware this is to her detriment. Able to stand when cooking longer if she leans on th counter for support. Exeriences intermittent difficulty getting out of a chair.  Current Pain level: 4/10.     Initial Pain level: 5/10.   Changes in function:  Yes (See Goal flowsheet attached for changes in current functional level)  Adverse reaction to treatment or activity: None    OBJECTIVE  Objective: Patient noted overall, to have a flatter affect than usual in clinic. Ambulating with similar deviations through clinic with reduced peña, small shuffling steps with SEC. Patient consistently needing several tries to risefrom chair with UE assistance. AROM of R knee 0- and L knee 0-. No improvement from 5/26/17 PN in MMT: B hip flexors 3+/5, B knee extension 4-/5(with crepitus), B knee flexion 3+/5. Unable to SLS on either LE. Slight improvement in ADL score from 60% to 61.3%.     ASSESSMENT/PLAN  Updated problem list and treatment plan: Diagnosis 1:  B knee DJD/difficulty walking  Pain -  self management, education and home program  Decreased ROM/flexibility - therapeutic exercise, therapeutic activity and home program  Decreased strength - therapeutic exercise, therapeutic activities and home program  Decreased  proprioception - neuro re-education, gait training, therapeutic activities and home program  Impaired gait - gait training, assistive devices and home program  Decreased function - therapeutic activities and home program  STG/LTGs have been met or progress has been made towards goals:  Yes (See Goal flow sheet completed today.)  Assessment of Progress: The patient's condition is slowly improving after fall last April.  Self Management Plans:  Patient has been instructed in a home treatment program.  Patient  has been instructed in self management of symptoms.  I have re-evaluated this patient and find that the nature, scope, duration and intensity of the therapy is appropriate for the medical condition of the patient.  Jazzmine continues to require the following intervention to meet STG and LTG's:  PT    Recommendations:  This patient would benefit from continued therapy.     Frequency:  1 X week, once daily  Duration:  Every other week(total of 6 visits remaining, 12 addition weeks of PT anticipated)          Please refer to the daily flowsheet for treatment today, total treatment time and time spent performing 1:1 timed codes.

## 2017-08-11 ENCOUNTER — THERAPY VISIT (OUTPATIENT)
Dept: PHYSICAL THERAPY | Facility: CLINIC | Age: 82
End: 2017-08-11
Payer: MEDICARE

## 2017-08-11 DIAGNOSIS — M17.0 PRIMARY OSTEOARTHRITIS OF BOTH KNEES: ICD-10-CM

## 2017-08-11 DIAGNOSIS — R26.2 DIFFICULTY WALKING: ICD-10-CM

## 2017-08-11 PROCEDURE — 97112 NEUROMUSCULAR REEDUCATION: CPT | Mod: GP | Performed by: PHYSICAL THERAPIST

## 2017-08-11 PROCEDURE — 97110 THERAPEUTIC EXERCISES: CPT | Mod: GP | Performed by: PHYSICAL THERAPIST

## 2017-09-08 ENCOUNTER — THERAPY VISIT (OUTPATIENT)
Dept: PHYSICAL THERAPY | Facility: CLINIC | Age: 82
End: 2017-09-08
Payer: MEDICARE

## 2017-09-08 DIAGNOSIS — R26.2 DIFFICULTY WALKING: ICD-10-CM

## 2017-09-08 DIAGNOSIS — M17.0 PRIMARY OSTEOARTHRITIS OF BOTH KNEES: ICD-10-CM

## 2017-09-08 PROCEDURE — 97110 THERAPEUTIC EXERCISES: CPT | Mod: GP | Performed by: PHYSICAL THERAPIST

## 2017-09-08 PROCEDURE — 97112 NEUROMUSCULAR REEDUCATION: CPT | Mod: GP | Performed by: PHYSICAL THERAPIST

## 2017-09-08 ASSESSMENT — ACTIVITIES OF DAILY LIVING (ADL)
AS_A_RESULT_OF_YOUR_KNEE_INJURY,_HOW_WOULD_YOU_RATE_YOUR_CURRENT_LEVEL_OF_DAILY_ACTIVITY?: NEARLY NORMAL
GIVING WAY, BUCKLING OR SHIFTING OF KNEE: THE SYMPTOM AFFECTS MY ACTIVITY SLIGHTLY
KNEEL ON THE FRONT OF YOUR KNEE: I AM UNABLE TO DO THE ACTIVITY
HOW_WOULD_YOU_RATE_THE_CURRENT_FUNCTION_OF_YOUR_KNEE_DURING_YOUR_USUAL_DAILY_ACTIVITIES_ON_A_SCALE_FROM_0_TO_100_WITH_100_BEING_YOUR_LEVEL_OF_KNEE_FUNCTION_PRIOR_TO_YOUR_INJURY_AND_0_BEING_THE_INABILITY_TO_PERFORM_ANY_OF_YOUR_USUAL_DAILY_ACTIVITIES?: 65
GO DOWN STAIRS: ACTIVITY IS MINIMALLY DIFFICULT
SQUAT: ACTIVITY IS FAIRLY DIFFICULT
RISE FROM A CHAIR: ACTIVITY IS SOMEWHAT DIFFICULT
KNEE_ACTIVITY_OF_DAILY_LIVING_SCORE: 62.86
RAW_SCORE: 44
STAND: ACTIVITY IS SOMEWHAT DIFFICULT
LIMPING: I HAVE THE SYMPTOM BUT IT DOES NOT AFFECT MY ACTIVITY
SIT WITH YOUR KNEE BENT: ACTIVITY IS NOT DIFFICULT
WALK: ACTIVITY IS SOMEWHAT DIFFICULT
PAIN: THE SYMPTOM AFFECTS MY ACTIVITY MODERATELY
KNEE_ACTIVITY_OF_DAILY_LIVING_SUM: 44
SWELLING: I DO NOT HAVE THE SYMPTOM
HOW_WOULD_YOU_RATE_THE_OVERALL_FUNCTION_OF_YOUR_KNEE_DURING_YOUR_USUAL_DAILY_ACTIVITIES?: NEARLY NORMAL
STIFFNESS: I HAVE THE SYMPTOM BUT IT DOES NOT AFFECT MY ACTIVITY
GO UP STAIRS: ACTIVITY IS MINIMALLY DIFFICULT
WEAKNESS: THE SYMPTOM AFFECTS MY ACTIVITY MODERATELY

## 2017-09-08 NOTE — MR AVS SNAPSHOT
"              After Visit Summary   9/8/2017    Jazzmine Obrien    MRN: [id number]           Patient Information     Date Of Birth          4/1/1930        Visit Information        Provider Department      9/8/2017 2:10 PM Sarah Mota PT [facility] Physical Therapy        Today's Diagnoses     Primary osteoarthritis of both knees        Difficulty walking           Follow-ups after your visit        Your next 10 appointments already scheduled     Sep 22, 2017  2:10 PM CDT   PO Extremity with Sarah Bashir PT   [facility] Physical Therapy ([place])    [address] [address]   [phone]              Who to contact     If you have questions or need follow up information about today's clinic visit or your schedule please contact [facility] PHYSICAL THERAPY directly at [phone].  Normal or non-critical lab and imaging results will be communicated to you by [employer], letter or phone within 4 business days after the clinic has received the results. If you do not hear from us within 7 days, please contact the clinic through [employer] or phone. If you have a critical or abnormal lab result, we will notify you by phone as soon as possible.  Submit refill requests through [employer] or call your pharmacy and they will forward the refill request to us. Please allow 3 business days for your refill to be completed.          Additional Information About Your Visit        [employer] Information     [employer] lets you send messages to your doctor, view your test results, renew your prescriptions, schedule appointments and more. To sign up, go to [email or web address] . Click on \"Log in\" on the left side of the screen, which will take you to the Welcome page. Then click on \"Sign up Now\" on the right side of the page.     You will be asked to " enter the access code listed below, as well as some personal information. Please follow the directions to create your username and password.     Your access code is: [email or web address]  Expires: 12/10/2017  8:37 AM     Your access code will  in 90 days. If you need help or a new code, please call your [place] clinic or [phone].        Care EveryWhere ID     This is your Care EveryWhere ID. This could be used by other organizations to access your [place] medical records  [id number]         Blood Pressure from Last 3 Encounters:   No data found for BP    Weight from Last 3 Encounters:   No data found for Wt              We Performed the Following     PO PROGRESS NOTES REPORT     NEUROMUSCULAR RE-EDUCATION     THERAPEUTIC EXERCISES        Primary Care Provider    None Specified       No primary provider on file.        Equal Access to Services     ETHAN WADDELL : Jacqui Lowry, waaxda luqadaha, qaybta kaalmajeffry short, kaci castle . So [facility] [phone].    ATENCIÓN: Si habla español, tiene a goldstein disposición servicios gratuitos de asistencia lingüística. Llame al [phone].    We comply with applicable federal civil rights laws and [place] laws. We do not discriminate on the basis of race, color, national origin, age, disability sex, sexual orientation or gender identity.            Thank you!     Thank you for choosing [facility] PH[facility] THERAP[facility]  for your care. Our goal is always to provide you with excellent care. Hearing back from our patients is one way we can continue to improve our services. Please take a few minutes to complete the written survey that you may receive in the mail after your visit with us. Thank you!             Your Updated Medication List - Protect others around you: Learn how to safely use, store and throw away your medicines at www.disposemymeds.org.      Notice  As of 2017 11:59 PM    You have  not been prescribed any medications.

## 2017-09-08 NOTE — LETTER
"[facility] PHYSICAL THERAPY  [address]  [place] [address]  [phone]    2017    Re: Jazzmine Obrien   :   1930  MRN:  [id number]   REFERRING PHYSICIAN:   Anabela Paredes    [facility] PHYSICAL THERAPY    Date of Initial Evaluation:  2016  Visits:  Rxs Used: 22  Reason for Referral:     Primary osteoarthritis of both knees  Difficulty walking    PROGRESS  REPORT/RECERTIFICATION  Progress reporting period is from 17 to 17.       SUBJECTIVE  HPI  Knee Activity of Daily Living Score: 62.86  Subjective: Patient unable to attend her last PT appt due to stomach trouble, returns to clinic after almost one month absence. Patient reporting her knees are hurting, waiting for \"gel\" shots in them in beginning of October. Last cortisone injections at end of  did not seem to help. Able to walk around house without cane most of the time. Able to stand using counter for support longer and with less pain.     Current pain level is 4/10  .     Initial Pain level: 5/10.   Changes in function:  Yes (See Goal flowsheet attached for changes in current functional level)  Adverse reaction to treatment or activity: None    OBJECTIVE  Objective: Ambulates into clinic with SEC slowly with small steps, no change in the wind swept alignment of knees(R valgus and L varus).  Able to transfer to out of a chair using B UE's 5x with 1-2 attempts consistently.  Slightly improved AROM of R knee 0- and L knee 0-. Improved  MMT: B hip flexors 4-/5, B knee extension 4/5(with crepitus), B knee flexion 4-/5. Unable to SLS on either LE. Slight improvement in ADL score from 61.3% to 63%.        ASSESSMENT/PLAN  Updated problem list and treatment plan: Diagnosis 1:  B knee DJD/difficulty walking  Pain -  self management and education  Decreased ROM/flexibility - therapeutic exercise, therapeutic " activity and home program  Decreased strength - therapeutic exercise, therapeutic activities and home program  Impaired balance - neuro re-education, gait training, therapeutic activities and home program  Impaired gait - gait training, assistive devices and home program  Re: Jazzmine Obrien   :   1930    Decreased function - therapeutic activities and home program  STG/LTGs have been met or progress has been made towards goals:  Yes (See Goal flow sheet completed today.)  Assessment of Progress: The patient's condition is improving.  The patient's condition has potential to improve.  Self Management Plans:  Patient has been instructed in a home treatment program.  Patient  has been instructed in self management of symptoms.  I have re-evaluated this patient and find that the nature, scope, duration and intensity of the therapy is appropriate for the medical condition of the patient.  Jazzmine continues to require the following intervention to meet STG and LTG's:  PT    Recommendations:  This patient would benefit from continued therapy.     Frequency:  1 X week, once daily  Duration:  Every other week for 8 weeks(4 additional visits remain on current MD orders).    Thank you for your referral.    INQUIRIES  Therapist: Sarah Elkins, PT   [facility] [place]  [address] [address]  Phone: [phone]  Fax: [phone]

## 2017-09-11 NOTE — PROGRESS NOTES
"Subjective:    HPI       Knee Activity of Daily Living Score: 62.86            Objective:    System    Physical Exam    General     ROS    Assessment/Plan:      PROGRESS  REPORT/RECERTIFICATION    Progress reporting period is from 7/28/17 to 9/8/17.       SUBJECTIVE  Subjective: Patient unable to attend her last PT appt due to stomach trouble, returns to clinic after almost one month absence. Patient reporting her knees are hurting, waiting for \"gel\" shots in them in beginning of October. Last cortisone injections at end of June did not seem to help. Able to walk around house without cane most of the time. Able to stand using counter for support longer and with less pain.     Current pain level is 4/10  .     Initial Pain level: 5/10.   Changes in function:  Yes (See Goal flowsheet attached for changes in current functional level)  Adverse reaction to treatment or activity: None    OBJECTIVE    Objective: Ambulates into clinic with SEC slowly with small steps, no change in the wind swept alignment of knees(R valgus and L varus).  Able to transfer to out of a chair using B UE's 5x with 1-2 attempts consistently.  Slightly improved AROM of R knee 0- and L knee 0-. Improved  MMT: B hip flexors 4-/5, B knee extension 4/5(with crepitus), B knee flexion 4-/5. Unable to SLS on either LE. Slight improvement in ADL score from 61.3% to 63%.          ASSESSMENT/PLAN  Updated problem list and treatment plan: Diagnosis 1:  B knee DJD/difficulty walking  Pain -  self management and education  Decreased ROM/flexibility - therapeutic exercise, therapeutic activity and home program  Decreased strength - therapeutic exercise, therapeutic activities and home program  Impaired balance - neuro re-education, gait training, therapeutic activities and home program  Impaired gait - gait training, assistive devices and home program  Decreased function - therapeutic activities and home program  STG/LTGs have been met or progress " has been made towards goals:  Yes (See Goal flow sheet completed today.)  Assessment of Progress: The patient's condition is improving.  The patient's condition has potential to improve.  Self Management Plans:  Patient has been instructed in a home treatment program.  Patient  has been instructed in self management of symptoms.  I have re-evaluated this patient and find that the nature, scope, duration and intensity of the therapy is appropriate for the medical condition of the patient.  Jazzmine continues to require the following intervention to meet STG and LTG's:  PT    Recommendations:  This patient would benefit from continued therapy.     Frequency:  1 X week, once daily  Duration:  Every other week for 8 weeks(4 additional visits remain on current MD orders).          Please refer to the daily flowsheet for treatment today, total treatment time and time spent performing 1:1 timed codes.

## 2019-03-26 ENCOUNTER — TRANSFERRED RECORDS (OUTPATIENT)
Dept: PHYSICAL THERAPY | Facility: CLINIC | Age: 84
End: 2019-03-26

## 2019-05-03 ENCOUNTER — THERAPY VISIT (OUTPATIENT)
Dept: PHYSICAL THERAPY | Facility: CLINIC | Age: 84
End: 2019-05-03
Payer: MEDICARE

## 2019-05-03 DIAGNOSIS — M62.81 GENERALIZED MUSCLE WEAKNESS: ICD-10-CM

## 2019-05-03 DIAGNOSIS — M17.0 PRIMARY OSTEOARTHRITIS OF BOTH KNEES: Primary | ICD-10-CM

## 2019-05-03 PROCEDURE — 97162 PT EVAL MOD COMPLEX 30 MIN: CPT | Mod: GP | Performed by: PHYSICAL THERAPIST

## 2019-05-03 PROCEDURE — 97110 THERAPEUTIC EXERCISES: CPT | Mod: GP | Performed by: PHYSICAL THERAPIST

## 2019-05-03 NOTE — LETTER
DEPARTMENT OF HEALTH AND HUMAN SERVICES  [facility] FOR MED[facility]ARE & MEDICAID SERVICES    PLAN/UPDATED PLAN OF PROGRESS FOR OUTPATIENT REHABILITATION    PATIENTS NAME:  Jazzmine Obrien   : 1930  PROVIDER NUMBER:    [id number]    HICN:  [id number]    PROVIDER NAME: [place] [facility]    MEDICAL RECORD NUMBER: [id number]     START OF CARE DATE:  SOC Date: 19   TYPE:  PT    PRIMARY/TREATMENT DIAGNOSIS: (Pertinent Medical Diagnosis)   Generalized muscle weakness  Primary osteoarthritis of both knees    VISITS FROM START OF CARE:  Rxs Used: 1     [facility] Initial Evaluation    Subjective:  Jazzmine Obrien is a 89 year old female with a left knee and right knee (LE's) condition.  Condition occurred with:  Degenerative joint disease (progressive weakness).    This is a chronic condition  Patient saw MD on 19 for progressive weakness over the past 1-2 years.    Patient reports pain:  In the joint and anterior (L>R).    Pain is described as aching and sharp and is intermittent and reported as 6/10.  Associated symptoms:  Buckling/giving out, edema, loss of strength and loss of motion/stiffness. Worse during: varies from day to day, activity to activity.  Symptoms are exacerbated by transfers, walking, descending stairs, ascending stairs and standing and relieved by rest, analgesics and NSAID's.  Since onset symptoms are gradually worsening.  Special testing: none recent.  Previous treatment includes physical therapy.  There was moderate improvement following previous treatment.  General health as reported by patient is good.  Pertinent medical history includes:  Depression, diabetes, concussions/dizziness, incontinence, osteoarthritis, overweight, smoking and thyroid problems.  Medical allergies: no.  Other surgeries include:  None reported.  Current medications:  Anti-depressants and thyroid medication (Anxiety).        Barriers include:  Requires  assistance with ADL's and transportation.  Red flags:  None as reported by the patient.                Objective:  Standing Alignment:    Cervical/Thoracic:  Thoracic kyphosis increased and forward head  Shoulder/UE:  Rounded shoulders  Knee:  Genu varus L and genu valgus R  Gait:  Patient arrives via WC into clinic  Gait Type:  Antalgic   Weight Bearing Status:  WBAT   Assistive Devices:  Cane  Deviations:  Lumbar:  Trunk flexionAnkle:  Heel strike decr R, heel strike decr L, push off decr L and push off decr RGeneral Deviations:  Jayne decr, stride length decr and   PATIENTS NAME:  Jazzmine Obrien   : 1930    base of support incr  Flexibility/Screens:  Lower Extremity:  Decreased left lower extremity flexibility:Hamstrings and Gastroc  Decreased right lower extremity flexibility:  Hamstrings and Gastroc       Hip Evaluation  HIP AROM:    Flexion: Left: 95    Right:  95  Extension: Left: -5    Right:  -5    Hip Strength:    Flexion:   Left: 4-/5   +/-  Pain:  Right: 4-/5   +/-  Pain:                 Extension:  Left: 3-/5  Pain:Right: 3-/5    +/-  Pain:    Abduction:  Left: 3-/5    +/-   Pain:Right: 3-/5   +/-   Pain:      Knee Evaluation:  ROM:    AROM  Hyperextension:  Left:  0    Right: 0  Extension:  Left: 10    Right:  10  Flexion: Left: 120    Right: 115  PROM  Extension: Left: 8    Right:  8  Flexion: Left: 122    Right:  116  Strength:   Extension:  Left: 4-/5    Pain:+      Right: 4-/5    Pain:+  Flexion:  Left: 3+/5    Pain:+/-      Right: 3+/5    Pain:+/-    Quad Set Left: Fair    Pain:   Quad Set Right: Fair    Pain:  Mobility Testing:    Patellofemoral Medial:  Left: hypomobile    Right: hypomobile  Patellofemoral Lateral:  Left: hypomobile    Right: hypomobile  Functional Testing:    Proprioception:   Stork Balance Test:  Left:  Unable  Right:  Unable  % of Uninvolved:       General Evaluation:  Balance:    Sit to Stand Test: 6/reps  Functional Assessment:    Timed up and go:  20/sec  Gait  speed:  .5/m/s                                   Assessment/Plan:    Patient is a 89 year old female with bilateral knee and generalized weakness complaints.    Patient has the following significant findings with corresponding treatment plan.                Diagnosis 1:  B knee DJD/generalized weakness(fall risk)  Pain -  self management and home program  Decreased ROM/flexibility - therapeutic exercise, therapeutic activity and home program  PATIENTS NAME:  Jazzmine Obrien   : 1930    Decreased joint mobility - therapeutic exercise, therapeutic activity and home program  Decreased strength - therapeutic exercise, therapeutic activities and home program  Impaired balance - neuro re-education, gait training, therapeutic activities and home program  Decreased proprioception - neuro re-education, gait training, therapeutic activities and home program  Impaired gait - gait training, assistive devices and home program  Decreased function - therapeutic activities and home program  Impaired posture - neuro re-education, therapeutic activities and home program    Therapy Evaluation Codes:   1) History comprised of:   Personal factors that impact the plan of care:      Age, Anxiety and Time since onset of symptoms.    Comorbidity factors that impact the plan of care are:      None.     Medications impacting care: None.  2) Examination of Body Systems comprised of:   Body structures and functions that impact the plan of care:      Knee and B LE/UE's.   Activity limitations that impact the plan of care are:      Bathing, Cooking, Dressing, Stairs, Standing, Walking and transfers out of chair/car/bed.  3) Clinical presentation characteristics are:   Evolving/Changing.  4) Decision-Making    Moderate complexity using standardized patient assessment instrument and/or measureable assessment of functional outcome.  Cumulative Therapy Evaluation is: Moderate complexity.    Previous and current functional limitations:  (See Goal  "Flow Sheet for this information)    Short term and Long term goals: (See Goal Flow Sheet for this information)     Communication ability:  Patient appears to be able to clearly communicate and understand verbal and written communication and follow directions correctly.  Treatment Explanation - The following has been discussed with the patient:   RX ordered/plan of care  Anticipated outcomes  Possible risks and side effects  This patient would benefit from PT intervention to resume normal activities.   Rehab potential is fair.    Frequency:  1 X week, once daily  Duration:  for 8 weeks  Discharge Plan:  Achieve all LTG.  Independent in home treatment program.  Reach maximal therapeutic benefit.    Caregiver Signature/Credentials _____________________________ Date ________       Treating Provider: Sarah Elkins, PT     I have reviewed and certified the need for these services and plan of treatment while under my care.        PHYSICIAN'S SIGNATURE:   _________________________________________  Date___________   Anabela Paredes MD    Certification period:  Beginning of Cert date period: 05/03/19 to  End of Cert period date: 07/31/19     Functional Level Progress Report: Please see attached \"Goal Flow sheet for Functional level.\"    ____X____ Continue Services or       ________ DC Services                Service dates: From  SOC Date: 05/03/19 date to present                         "

## 2019-05-03 NOTE — PROGRESS NOTES
[facility] Initial Evaluation  Subjective:    Jazzmine Obrien is a 89 year old female with a left knee and right knee (LE's) condition.  Condition occurred with:  Degenerative joint disease (progressive weakness).    This is a chronic condition  Patient saw MD on 4/9/19 for progressive weakness over the past 1-2 years.    Patient reports pain:  In the joint and anterior (L>R).    Pain is described as aching and sharp and is intermittent and reported as 6/10.  Associated symptoms:  Buckling/giving out, edema, loss of strength and loss of motion/stiffness. Worse during: varies from day to day, activity to activity.  Symptoms are exacerbated by transfers, walking, descending stairs, ascending stairs and standing and relieved by rest, analgesics and NSAID's.  Since onset symptoms are gradually worsening.  Special testing: none recent.  Previous treatment includes physical therapy.  There was moderate improvement following previous treatment.  General health as reported by patient is good.  Pertinent medical history includes:  Depression, diabetes, concussions/dizziness, incontinence, osteoarthritis, overweight, smoking and thyroid problems.  Medical allergies: no.  Other surgeries include:  None reported.  Current medications:  Anti-depressants and thyroid medication (Anxiety).          Barriers include:  Requires assistance with ADL's and transportation.    Red flags:  None as reported by the patient.                        Objective:  Standing Alignment:    Cervical/Thoracic:  Thoracic kyphosis increased and forward head  Shoulder/UE:  Rounded shoulders        Knee:  Genu varus L and genu valgus R      Gait:  Patient arrives via  into clinic  Gait Type:  Antalgic   Weight Bearing Status:  WBAT   Assistive Devices:  Cane  Deviations:  Lumbar:  Trunk flexionAnkle:  Heel strike decr R, heel strike decr L, push off decr L and push off decr RGeneral Deviations:  Jayne decr, stride length decr and  base of support incr    Flexibility/Screens:       Lower Extremity:  Decreased left lower extremity flexibility:Hamstrings and Gastroc    Decreased right lower extremity flexibility:  Hamstrings and Gastroc                                                 Hip Evaluation  HIP AROM:    Flexion: Left: 95    Right:  95    Extension: Left: -5    Right:  -5                Hip Strength:    Flexion:   Left: 4-/5   +/-  Pain:  Right: 4-/5   +/-  Pain:                    Extension:  Left: 3-/5  Pain:Right: 3-/5    +/-  Pain:    Abduction:  Left: 3-/5    +/-   Pain:Right: 3-/5   +/-   Pain:                           Knee Evaluation:  ROM:    AROM    Hyperextension:  Left:  0    Right: 0  Extension:  Left: 10    Right:  10  Flexion: Left: 120    Right: 115  PROM      Extension: Left: 8    Right:  8  Flexion: Left: 122    Right:  116      Strength:     Extension:  Left: 4-/5    Pain:+      Right: 4-/5    Pain:+  Flexion:  Left: 3+/5    Pain:+/-      Right: 3+/5    Pain:+/-    Quad Set Left: Fair    Pain:   Quad Set Right: Fair    Pain:          Mobility Testing:      Patellofemoral Medial:  Left: hypomobile    Right: hypomobile  Patellofemoral Lateral:  Left: hypomobile    Right: hypomobile      Functional Testing:            Proprioception:   Stork Balance Test:  Left:  Unable  Right:  Unable  % of Uninvolved:               General Evaluation:                      Balance:          Sit to Stand Test: 6/reps      Functional Assessment:      Timed up and go:  20/sec  Gait speed:  .5/m/s                                         ROS    Assessment/Plan:    Patient is a 89 year old female with bilateral knee and generalized weakness complaints.    Patient has the following significant findings with corresponding treatment plan.                Diagnosis 1:  B knee DJD/generalized weakness(fall risk)  Pain -  self management and home program  Decreased ROM/flexibility - therapeutic exercise, therapeutic activity and home  program  Decreased joint mobility - therapeutic exercise, therapeutic activity and home program  Decreased strength - therapeutic exercise, therapeutic activities and home program  Impaired balance - neuro re-education, gait training, therapeutic activities and home program  Decreased proprioception - neuro re-education, gait training, therapeutic activities and home program  Impaired gait - gait training, assistive devices and home program  Decreased function - therapeutic activities and home program  Impaired posture - neuro re-education, therapeutic activities and home program    Therapy Evaluation Codes:   1) History comprised of:   Personal factors that impact the plan of care:      Age, Anxiety and Time since onset of symptoms.    Comorbidity factors that impact the plan of care are:      None.     Medications impacting care: None.  2) Examination of Body Systems comprised of:   Body structures and functions that impact the plan of care:      Knee and B LE/UE's.   Activity limitations that impact the plan of care are:      Bathing, Cooking, Dressing, Stairs, Standing, Walking and transfers out of chair/car/bed.  3) Clinical presentation characteristics are:   Evolving/Changing.  4) Decision-Making    Moderate complexity using standardized patient assessment instrument and/or measureable assessment of functional outcome.  Cumulative Therapy Evaluation is: Moderate complexity.    Previous and current functional limitations:  (See Goal Flow Sheet for this information)    Short term and Long term goals: (See Goal Flow Sheet for this information)     Communication ability:  Patient appears to be able to clearly communicate and understand verbal and written communication and follow directions correctly.  Treatment Explanation - The following has been discussed with the patient:   RX ordered/plan of care  Anticipated outcomes  Possible risks and side effects  This patient would benefit from PT intervention to resume  normal activities.   Rehab potential is fair.    Frequency:  1 X week, once daily  Duration:  for 8 weeks  Discharge Plan:  Achieve all LTG.  Independent in home treatment program.  Reach maximal therapeutic benefit.    Please refer to the daily flowsheet for treatment today, total treatment time and time spent performing 1:1 timed codes.

## 2019-05-03 NOTE — PROGRESS NOTES
Patient did not return for further treatment and no additional progress was noted.  Please refer to the progress note and goal flowsheet completed on 09/08/17(re-certification) for discharge information.

## 2019-05-06 PROBLEM — M62.81 GENERALIZED MUSCLE WEAKNESS: Status: ACTIVE | Noted: 2019-05-06

## 2019-05-13 ENCOUNTER — THERAPY VISIT (OUTPATIENT)
Dept: PHYSICAL THERAPY | Facility: CLINIC | Age: 84
End: 2019-05-13
Payer: MEDICARE

## 2019-05-13 DIAGNOSIS — M62.81 GENERALIZED MUSCLE WEAKNESS: ICD-10-CM

## 2019-05-13 PROCEDURE — 97110 THERAPEUTIC EXERCISES: CPT | Mod: GP | Performed by: PHYSICAL THERAPY ASSISTANT

## 2019-05-23 ENCOUNTER — THERAPY VISIT (OUTPATIENT)
Dept: PHYSICAL THERAPY | Facility: CLINIC | Age: 84
End: 2019-05-23
Payer: MEDICARE

## 2019-05-23 DIAGNOSIS — M62.81 GENERALIZED MUSCLE WEAKNESS: ICD-10-CM

## 2019-05-23 PROCEDURE — 97110 THERAPEUTIC EXERCISES: CPT | Mod: GP | Performed by: PHYSICAL THERAPIST

## 2019-06-07 ENCOUNTER — THERAPY VISIT (OUTPATIENT)
Dept: PHYSICAL THERAPY | Facility: CLINIC | Age: 84
End: 2019-06-07
Payer: MEDICARE

## 2019-06-07 DIAGNOSIS — M62.81 GENERALIZED MUSCLE WEAKNESS: ICD-10-CM

## 2019-06-07 PROCEDURE — 97110 THERAPEUTIC EXERCISES: CPT | Mod: GP | Performed by: PHYSICAL THERAPIST

## 2019-06-13 ENCOUNTER — THERAPY VISIT (OUTPATIENT)
Dept: PHYSICAL THERAPY | Facility: CLINIC | Age: 84
End: 2019-06-13
Payer: MEDICARE

## 2019-06-13 DIAGNOSIS — M62.81 GENERALIZED MUSCLE WEAKNESS: ICD-10-CM

## 2019-06-13 PROCEDURE — 97110 THERAPEUTIC EXERCISES: CPT | Mod: GP | Performed by: PHYSICAL THERAPIST

## 2019-06-27 ENCOUNTER — THERAPY VISIT (OUTPATIENT)
Dept: PHYSICAL THERAPY | Facility: CLINIC | Age: 84
End: 2019-06-27
Payer: MEDICARE

## 2019-06-27 DIAGNOSIS — M62.81 GENERALIZED MUSCLE WEAKNESS: ICD-10-CM

## 2019-06-27 PROCEDURE — 97110 THERAPEUTIC EXERCISES: CPT | Mod: GP | Performed by: PHYSICAL THERAPIST

## 2019-06-27 PROCEDURE — 97112 NEUROMUSCULAR REEDUCATION: CPT | Mod: GP | Performed by: PHYSICAL THERAPIST

## 2019-07-11 ENCOUNTER — THERAPY VISIT (OUTPATIENT)
Dept: PHYSICAL THERAPY | Facility: CLINIC | Age: 84
End: 2019-07-11
Payer: MEDICARE

## 2019-07-11 DIAGNOSIS — M62.81 GENERALIZED MUSCLE WEAKNESS: ICD-10-CM

## 2019-07-11 PROCEDURE — 97110 THERAPEUTIC EXERCISES: CPT | Mod: GP | Performed by: PHYSICAL THERAPIST

## 2020-02-28 NOTE — PROGRESS NOTES
Discharge Note    Progress reporting period is from last progress note on Jul 11, 2019.    Jazzmine failed to follow up and current status is unknown.  Please see information below for last relevant information on current status.  Patient seen for 7 visits.    SUBJECTIVE  Subjective changes noted by patient:  Patient came from the rhuematologist after having had injections  her L knee, had one in R knee 3 weeks ago. Now will wait for 3 months again before se can have another one. Was told to take it easy in PT due to the injection. No significant changes reported with patient's mobility, walking and/or ability to perfrom all tranfers. No falls or near falls.      Current pain level is 6/10.     Previous pain level was  6/10 .   Changes in function:  Yes (See Goal flowsheet attached for changes in current functional level)  Adverse reaction to treatment or activity: None    OBJECTIVE  Changes noted in objective findings: Arrives in clinic via wc. Patient's daughter brings in a picture of a pedlar type unit she was thinking about buying for pt to exercise at home. Amb throughout clinic with FWW. Sit to stand successfully after 1-2 trys.      ASSESSMENT/PLAN  Diagnosis: generalized weakness/B knee DJD   Updated problem list and treatment plan:   Pain - HEP  Decreased function - HEP  Decreased strength - HEP  Impaired gait - HEP  STG/LTGs have been met or progress has been made towards goals:  Yes, please see goal flowsheet for most current information  Assessment of Progress: current status is unknown.    Last current status: Pt is progressing as expected, Pt is progressing slower than anticipated   Self Management Plans:  HEP  I have re-evaluated this patient and find that the nature, scope, duration and intensity of the therapy is appropriate for the medical condition of the patient.  Jazzmine continues to require the following intervention to meet STG and LTG's:  HEP.    Recommendations:  Discharge with current home  program.  Patient to follow up with MD as needed.    Please refer to the daily flowsheet for treatment today, total treatment time and time spent performing 1:1 timed codes.

## 2020-03-01 PROBLEM — M62.81 GENERALIZED MUSCLE WEAKNESS: Status: RESOLVED | Noted: 2019-05-06 | Resolved: 2019-12-06
